# Patient Record
Sex: FEMALE | Race: WHITE | NOT HISPANIC OR LATINO | Employment: FULL TIME | URBAN - METROPOLITAN AREA
[De-identification: names, ages, dates, MRNs, and addresses within clinical notes are randomized per-mention and may not be internally consistent; named-entity substitution may affect disease eponyms.]

---

## 2023-01-11 ENCOUNTER — OFFICE VISIT (OUTPATIENT)
Dept: OBSTETRICS AND GYNECOLOGY | Facility: CLINIC | Age: 28
End: 2023-01-11
Payer: COMMERCIAL

## 2023-01-11 VITALS — SYSTOLIC BLOOD PRESSURE: 120 MMHG | WEIGHT: 151.69 LBS | DIASTOLIC BLOOD PRESSURE: 79 MMHG

## 2023-01-11 DIAGNOSIS — N86 ECTROPION OF CERVIX: ICD-10-CM

## 2023-01-11 DIAGNOSIS — R10.2 PELVIC PAIN: Primary | ICD-10-CM

## 2023-01-11 PROCEDURE — 3078F PR MOST RECENT DIASTOLIC BLOOD PRESSURE < 80 MM HG: ICD-10-PCS | Mod: CPTII,S$GLB,, | Performed by: STUDENT IN AN ORGANIZED HEALTH CARE EDUCATION/TRAINING PROGRAM

## 2023-01-11 PROCEDURE — 99999 PR PBB SHADOW E&M-NEW PATIENT-LVL III: ICD-10-PCS | Mod: PBBFAC,,, | Performed by: STUDENT IN AN ORGANIZED HEALTH CARE EDUCATION/TRAINING PROGRAM

## 2023-01-11 PROCEDURE — 81514 NFCT DS BV&VAGINITIS DNA ALG: CPT | Performed by: STUDENT IN AN ORGANIZED HEALTH CARE EDUCATION/TRAINING PROGRAM

## 2023-01-11 PROCEDURE — 1159F MED LIST DOCD IN RCRD: CPT | Mod: CPTII,S$GLB,, | Performed by: STUDENT IN AN ORGANIZED HEALTH CARE EDUCATION/TRAINING PROGRAM

## 2023-01-11 PROCEDURE — 87086 URINE CULTURE/COLONY COUNT: CPT | Performed by: STUDENT IN AN ORGANIZED HEALTH CARE EDUCATION/TRAINING PROGRAM

## 2023-01-11 PROCEDURE — 3074F PR MOST RECENT SYSTOLIC BLOOD PRESSURE < 130 MM HG: ICD-10-PCS | Mod: CPTII,S$GLB,, | Performed by: STUDENT IN AN ORGANIZED HEALTH CARE EDUCATION/TRAINING PROGRAM

## 2023-01-11 PROCEDURE — 3078F DIAST BP <80 MM HG: CPT | Mod: CPTII,S$GLB,, | Performed by: STUDENT IN AN ORGANIZED HEALTH CARE EDUCATION/TRAINING PROGRAM

## 2023-01-11 PROCEDURE — 99999 PR PBB SHADOW E&M-NEW PATIENT-LVL III: CPT | Mod: PBBFAC,,, | Performed by: STUDENT IN AN ORGANIZED HEALTH CARE EDUCATION/TRAINING PROGRAM

## 2023-01-11 PROCEDURE — 99203 OFFICE O/P NEW LOW 30 MIN: CPT | Mod: S$GLB,,, | Performed by: STUDENT IN AN ORGANIZED HEALTH CARE EDUCATION/TRAINING PROGRAM

## 2023-01-11 PROCEDURE — 1159F PR MEDICATION LIST DOCUMENTED IN MEDICAL RECORD: ICD-10-PCS | Mod: CPTII,S$GLB,, | Performed by: STUDENT IN AN ORGANIZED HEALTH CARE EDUCATION/TRAINING PROGRAM

## 2023-01-11 PROCEDURE — 99203 PR OFFICE/OUTPT VISIT, NEW, LEVL III, 30-44 MIN: ICD-10-PCS | Mod: S$GLB,,, | Performed by: STUDENT IN AN ORGANIZED HEALTH CARE EDUCATION/TRAINING PROGRAM

## 2023-01-11 PROCEDURE — 3074F SYST BP LT 130 MM HG: CPT | Mod: CPTII,S$GLB,, | Performed by: STUDENT IN AN ORGANIZED HEALTH CARE EDUCATION/TRAINING PROGRAM

## 2023-01-11 RX ORDER — ACYCLOVIR 800 MG/1
TABLET ORAL
COMMUNITY
Start: 2023-01-06

## 2023-01-11 NOTE — PROGRESS NOTES
CC: Pelvic Pain    HPI:  Kitty Baker is a 27 y.o. female  presents with complaint of pelvic pain and spotting with sex. For past two weeks has noted intermittent cramping and pelvic pressure/pain. Associated with urinary frequency, no burning or blood in urine, no odor. No change in her discharge and no abnormal bleeding (takes OCPs continuously, does not have periods). Reports general uncomfortable feeling. No fevers. Having regular BM.     ROS:  GENERAL: No fever, chills, fatigability or weight loss.  VULVAR: No pain, no lesions and no itching.  VAGINAL: No relaxation, no itching, no discharge, no abnormal bleeding and no lesions.  ABDOMEN: Denies nausea. Denies vomiting. No diarrhea. No constipation  BREAST: Denies pain. No lumps. No discharge.  URINARY: No incontinence, no nocturia, no frequency and no dysuria.  CARDIOVASCULAR: No chest pain. No shortness of breath. No leg cramps.  NEUROLOGICAL: No headaches. No vision changes.      Patient History:  History reviewed. No pertinent past medical history.  History reviewed. No pertinent surgical history.  Social History     Tobacco Use    Smoking status: Never    Smokeless tobacco: Never   Substance Use Topics    Alcohol use: Never    Drug use: Never     History reviewed. No pertinent family history.  OB History    Para Term  AB Living   0 0 0 0 0 0   SAB IAB Ectopic Multiple Live Births   0 0 0 0 0       Objective:   /79   Wt 68.8 kg (151 lb 10.8 oz)   LMP  (LMP Unknown)   No LMP recorded (lmp unknown). (Menstrual status: Birth Control).      PHYSICAL EXAM:  APPEARANCE: Well nourished, well developed, in no acute distress.  AFFECT: WNL, alert and oriented x 3  SKIN: No acne or hirsutism  NECK: Neck symmetric without masses or thyromegaly  NODES: No inguinal, cervical, axillary, or femoral lymph node enlargement  CHEST: Good respiratory effect  ABDOMEN: Soft.  No tenderness or masses.  No hepatosplenomegaly.  No hernias.  PELVIC:  Normal external genitalia without lesions.  Normal hair distribution.  Adequate perineal body, normal urethral meatus.  Vagina moist and well rugated without lesions or discharge.  Cervix pink, ectoprion present, no other lesions, discharge or tenderness (no CMT).  No significant cystocele or rectocele.  Bimanual exam shows uterus to be normal size, regular, mobile and nontender.  Adnexa without masses, mild TTP bilateral RLQ and LLQ  EXTREMITIES: No edema.      ASSESSMENT and PLAN:    ICD-10-CM ICD-9-CM    1. Pelvic pain  R10.2 [id number] US Pelvis Comp with Transvag NON-OB (xpd      Urine culture      Vaginosis Screen by DNA Probe      2. Ectropion of cervix  N86 622.0           Pelvic pain  - counseling with patient today including multiple possible etiologies of pain including infection, GI, , MSK. GYN causes including infection, menstrual cramps, endometriosis   - exam today TTP midline over bladder, bilateral adnexa   - Imaging: TVUS ordered  - urine culture, affirm collected to rule out infection. Will follow up with patient regarding results and treatment as indicated   - discussed pelvic floor PT for evaluation and treatment of possible pelvic floor dysfunction   - discussed medical management including: NSAIDs, gabapentin, vaginal valium, patient already taking continuous cycle OCPs  - will determine next steps after labs/imaging     2. Spotting with sex  - suspect due to ectropion, discussed benign nature of finding and no intervention needed         Follow up: as needed if symptoms worsen or if abnormal imaging       Abbie Florian MD  OBGYN [facility]

## 2023-01-12 LAB — BACTERIA UR CULT: NO GROWTH

## 2023-01-14 LAB
BACTERIAL VAGINOSIS DNA: NEGATIVE
CANDIDA GLABRATA DNA: NEGATIVE
CANDIDA KRUSEI DNA: NEGATIVE
CANDIDA RRNA VAG QL PROBE: POSITIVE
T VAGINALIS RRNA GENITAL QL PROBE: NEGATIVE

## 2023-01-14 RX ORDER — FLUCONAZOLE 150 MG/1
TABLET ORAL
Qty: 2 TABLET | Refills: 0 | Status: SHIPPED | OUTPATIENT
Start: 2023-01-14

## 2023-12-03 ENCOUNTER — OFFICE VISIT (OUTPATIENT)
Dept: URGENT CARE | Facility: CLINIC | Age: 28
End: 2023-12-03
Payer: COMMERCIAL

## 2023-12-03 VITALS
DIASTOLIC BLOOD PRESSURE: 80 MMHG | WEIGHT: 154 LBS | HEIGHT: 67 IN | HEART RATE: 82 BPM | SYSTOLIC BLOOD PRESSURE: 130 MMHG | BODY MASS INDEX: 24.17 KG/M2 | TEMPERATURE: 99 F | OXYGEN SATURATION: 99 % | RESPIRATION RATE: 15 BRPM

## 2023-12-03 DIAGNOSIS — L02.414 ABSCESS OF LEFT ARM: Primary | ICD-10-CM

## 2023-12-03 DIAGNOSIS — L03.114 LEFT ARM CELLULITIS: ICD-10-CM

## 2023-12-03 PROCEDURE — 99203 PR OFFICE/OUTPT VISIT, NEW, LEVL III, 30-44 MIN: ICD-10-PCS | Mod: 25,S$GLB,, | Performed by: PHYSICIAN ASSISTANT

## 2023-12-03 PROCEDURE — 10061 INCISION & DRAINAGE: ICD-10-PCS | Mod: S$GLB,,, | Performed by: PHYSICIAN ASSISTANT

## 2023-12-03 PROCEDURE — 99203 OFFICE O/P NEW LOW 30 MIN: CPT | Mod: 25,S$GLB,, | Performed by: PHYSICIAN ASSISTANT

## 2023-12-03 PROCEDURE — 10061 I&D ABSCESS COMP/MULTIPLE: CPT | Mod: S$GLB,,, | Performed by: PHYSICIAN ASSISTANT

## 2023-12-03 RX ORDER — AMOXICILLIN AND CLAVULANATE POTASSIUM 875; 125 MG/1; MG/1
1 TABLET, FILM COATED ORAL EVERY 12 HOURS
Qty: 20 TABLET | Refills: 0 | Status: SHIPPED | OUTPATIENT
Start: 2023-12-03

## 2023-12-03 RX ORDER — SULFAMETHOXAZOLE AND TRIMETHOPRIM 800; 160 MG/1; MG/1
1 TABLET ORAL 2 TIMES DAILY
Qty: 14 TABLET | Refills: 0 | Status: SHIPPED | OUTPATIENT
Start: 2023-12-03

## 2023-12-03 NOTE — PATIENT INSTRUCTIONS
REMOVE WOUND PACKING TOMORROW NIGHT OR TUESDAY.    KEEP WOUND CLEAN AND PROTECTED FROM OF BACTERIA   TAKE YOUR ANTIBIOTICS AS PRESCRIBED.    RETURN FOR ANY CONCERNS OR GO TO THE EMERGENCY DEPARTMENT IF YOU GET WORSE.

## 2023-12-03 NOTE — PROGRESS NOTES
"Subjective:      Patient ID: Kitty Baker is a 28 y.o. female.    Vitals:  height is 5' 7" (1.702 m) and weight is 69.9 kg (154 lb). Her oral temperature is 99 °F (37.2 °C). Her blood pressure is 130/80 and her pulse is 82. Her respiration is 15 and oxygen saturation is 99%.     Chief Complaint: Abscess    Patient states she got a mosquito bite on upper left arm 4 days ago. She reports she itched it a couple of times and is now red and warm to the touch. Patient reports drainage white/yellow. Patient states she had an old rx of keflex and started taking that.     Abscess  Chronicity:  New  Onset:  3-5 days ago (4 days ago)  Progression Since Onset: unchanged  Size:  5-7cm  Associated Symptoms: no fever  Characteristics: draining, painful and redness    Pain Scale:  4/10  Ineffective treatments: keflex.  Relieved by:  Nothing  Worsened by:  Nothing      Constitution: Negative for fever.   Skin:  Positive for erythema and abscess.      Objective:     Physical Exam   Constitutional: She is oriented to person, place, and time. She appears well-developed.   HENT:   Head: Normocephalic and atraumatic.   Ears:   Right Ear: External ear normal.   Left Ear: External ear normal.   Nose: Nose normal.   Mouth/Throat: Oropharynx is clear and moist.   Eyes: Conjunctivae, EOM and lids are normal. Pupils are equal, round, and reactive to light.   Neck: Trachea normal and phonation normal. Neck supple.   Musculoskeletal: Normal range of motion.         General: Normal range of motion.        Arms:       Comments:  Area 1.5 cm oval in the center with eschar.  Seems to be fluctuant center.  There is induration that extends out 3 cm with erythema.  There is no tracking up the arm.  No circumferential swelling   Neurological: She is alert and oriented to person, place, and time.   Skin: Skin is warm, dry and intact. erythema   Psychiatric: Her speech is normal and behavior is normal. Judgment and thought content normal.   Nursing note " "and vitals reviewed.  Incision & Drainage    Date/Time: 12/3/2023 4:55 PM    Performed by: Don Mcmahon PA  Authorized by: Don Mcmahon PA    Time out: Immediately prior to procedure a "time out" was called to verify the correct patient, procedure, equipment, support staff and site/side marked as required.    Consent Done?:  Yes (Verbal)    Type:  Abscess  Body area:  Upper extremity  Location details:  Left arm  Anesthesia:  Local infiltration  Local anesthetic: LET (lido,epi,tetracaine) and lidocaine 1% without epinephrine  Anesthetic total (ml):  1.5  Scalpel size:  11  Incision type:  Single straight  Incision depth: muscle    Complexity:  Complex  Drainage:  Pus, serosanguinous, purulent and viscous  Drainage amount:  Moderate  Wound treatment:  Incision, wound left open, deloculation, drainage, expression of material and wound packed  Packing material:  1/4 in gauze  Patient tolerance:  Patient tolerated the procedure well with no immediate complications  Pain Assessment: 0        Assessment:     1. Abscess of left arm    2. Left arm cellulitis        Plan:       Abscess of left arm  -     LETS (LIDOcaine-TETRAcaine-EPINEPHrine) gel solution  -     amoxicillin-clavulanate 875-125mg (AUGMENTIN) 875-125 mg per tablet; Take 1 tablet by mouth every 12 (twelve) hours.  Dispense: 20 tablet; Refill: 0  -     sulfamethoxazole-trimethoprim 800-160mg (BACTRIM DS) 800-160 mg Tab; Take 1 tablet by mouth 2 (two) times daily.  Dispense: 14 tablet; Refill: 0  -     Incision and drainage; Future    Left arm cellulitis  -     amoxicillin-clavulanate 875-125mg (AUGMENTIN) 875-125 mg per tablet; Take 1 tablet by mouth every 12 (twelve) hours.  Dispense: 20 tablet; Refill: 0  -     sulfamethoxazole-trimethoprim 800-160mg (BACTRIM DS) 800-160 mg Tab; Take 1 tablet by mouth 2 (two) times daily.  Dispense: 14 tablet; Refill: 0    Other orders  -     Incision & Drainage      Follow up if symptoms worsen or fail to improve, " for F/U with PCP or ED.   Patient Instructions    REMOVE WOUND PACKING TOMORROW NIGHT OR TUESDAY.    KEEP WOUND CLEAN AND PROTECTED FROM OF BACTERIA   TAKE YOUR ANTIBIOTICS AS PRESCRIBED.    RETURN FOR ANY CONCERNS OR GO TO THE EMERGENCY DEPARTMENT IF YOU GET WORSE.

## 2025-01-19 ENCOUNTER — PATIENT MESSAGE (OUTPATIENT)
Dept: OBSTETRICS AND GYNECOLOGY | Facility: CLINIC | Age: 30
End: 2025-01-19
Payer: COMMERCIAL